# Patient Record
Sex: MALE | URBAN - METROPOLITAN AREA
[De-identification: names, ages, dates, MRNs, and addresses within clinical notes are randomized per-mention and may not be internally consistent; named-entity substitution may affect disease eponyms.]

---

## 2019-11-18 ENCOUNTER — OFFICE VISIT (OUTPATIENT)
Dept: ORTHOPEDICS | Facility: CLINIC | Age: 59
End: 2019-11-18
Payer: MEDICAID

## 2019-11-18 VITALS — TEMPERATURE: 98 F | WEIGHT: 165.19 LBS | BODY MASS INDEX: 26.55 KG/M2 | HEIGHT: 66 IN

## 2019-11-18 DIAGNOSIS — S62.336A CLOSED DISPLACED FRACTURE OF NECK OF FIFTH METACARPAL BONE OF RIGHT HAND, INITIAL ENCOUNTER: Primary | ICD-10-CM

## 2019-11-18 LAB
BANDS: 1 % (ref 0–5)
BUN SERPL-MCNC: 18 MG/DL (ref 7–18)
BUN/CREAT SERPL: 20.45 RATIO (ref 7–18)
CALCIUM SERPL-MCNC: 9.3 MG/DL (ref 8.8–10.5)
CELLS COUNTED: 100
CHLORIDE SERPL-SCNC: 107 MMOL/L (ref 100–108)
CO2 SERPL-SCNC: 29 MMOL/L (ref 21–32)
CREAT SERPL-MCNC: 0.88 MG/DL (ref 0.7–1.3)
EOSINOPHIL NFR BLD: 2 % (ref 1–3)
ERYTHROCYTE [DISTWIDTH] IN BLOOD BY AUTOMATED COUNT: 14.5 % (ref 12.5–18)
GFR ESTIMATION: > 60
GLUCOSE SERPL-MCNC: 89 MG/DL (ref 70–110)
HCT VFR BLD AUTO: 37.7 % (ref 42–52)
HGB BLD-MCNC: 12.7 G/DL (ref 14–18)
LYMPHOCYTES NFR BLD: 47 % (ref 25–40)
MANUAL NRBC PER 100 CELLS: 0 %
MCH RBC QN AUTO: 29.9 PG (ref 27–31.2)
MCHC RBC AUTO-ENTMCNC: 33.7 G/DL (ref 31.8–35.4)
MCV RBC AUTO: 88.7 FL (ref 80–97)
MONOCYTES NFR BLD MANUAL: 14 % (ref 1–15)
NEUTROPHILS ABSOLUTE COUNT: 2.1 10*3/UL (ref 1.8–7.7)
NEUTROPHILS NFR BLD: 36 % (ref 37–80)
PLATELETS: 210 10*3/UL (ref 142–424)
POTASSIUM SERPL-SCNC: 4.8 MMOL/L (ref 3.6–5.2)
RBC # BLD AUTO: 4.25 10*6/UL (ref 4.7–6.1)
RBC MORPH BLD: ABNORMAL
SMALL PLATELETS BLD QL SMEAR: ADEQUATE
SODIUM BLD-SCNC: 144 MMOL/L (ref 135–145)
WBC # BLD: 5.8 10*3/UL (ref 4.6–10.2)

## 2019-11-18 PROCEDURE — 99201 PR OFFICE/OUTPT VISIT,NEW,LEVL I: ICD-10-PCS | Mod: S$GLB,,, | Performed by: ORTHOPAEDIC SURGERY

## 2019-11-18 PROCEDURE — 99201 PR OFFICE/OUTPT VISIT,NEW,LEVL I: CPT | Mod: S$GLB,,, | Performed by: ORTHOPAEDIC SURGERY

## 2019-11-18 RX ORDER — IBUPROFEN 800 MG/1
TABLET ORAL
COMMUNITY
Start: 2019-11-05

## 2019-11-18 RX ORDER — ALLOPURINOL 300 MG/1
TABLET ORAL
COMMUNITY
Start: 2019-11-05

## 2019-11-18 RX ORDER — LISINOPRIL 20 MG/1
TABLET ORAL
COMMUNITY
Start: 2019-11-05

## 2019-11-26 ENCOUNTER — OUTSIDE PLACE OF SERVICE (OUTPATIENT)
Dept: ORTHOPEDICS | Facility: CLINIC | Age: 59
End: 2019-11-26
Payer: MEDICAID

## 2019-11-26 PROCEDURE — 26615 TREAT METACARPAL FRACTURE: CPT | Mod: RT,,, | Performed by: ORTHOPAEDIC SURGERY

## 2019-11-26 PROCEDURE — 26615 PR OPEN TX METACARPAL FRACTURE SINGLE EA BONE: ICD-10-PCS | Mod: RT,,, | Performed by: ORTHOPAEDIC SURGERY

## 2019-12-03 ENCOUNTER — OFFICE VISIT (OUTPATIENT)
Dept: ORTHOPEDICS | Facility: CLINIC | Age: 59
End: 2019-12-03
Payer: MEDICAID

## 2019-12-03 DIAGNOSIS — S62.336A CLOSED DISPLACED FRACTURE OF NECK OF FIFTH METACARPAL BONE OF RIGHT HAND, INITIAL ENCOUNTER: Primary | ICD-10-CM

## 2019-12-03 PROCEDURE — 99024 POSTOP FOLLOW-UP VISIT: CPT | Mod: S$GLB,,, | Performed by: ORTHOPAEDIC SURGERY

## 2019-12-03 PROCEDURE — 99024 PR POST-OP FOLLOW-UP VISIT: ICD-10-PCS | Mod: S$GLB,,, | Performed by: ORTHOPAEDIC SURGERY

## 2019-12-03 RX ORDER — OXYCODONE AND ACETAMINOPHEN 5; 325 MG/1; MG/1
TABLET ORAL
COMMUNITY
Start: 2019-11-26

## 2019-12-03 NOTE — PROGRESS NOTES
Subjective:      Patient ID: Catrachito Ricci is a 59 y.o. male.    Chief Complaint: Post-op Evaluation of the Right Hand    HPI 59-year-old man 1 week postop ORIF right 5th metacarpal shaft fracture    ROS unchanged from prior visit      Objective:      Incision is clean without drainage. He has mild swelling      Ortho/SPM Exam            Assessment:       Encounter Diagnosis   Name Primary?    Closed displaced fracture of neck of fifth metacarpal bone of right hand, initial encounter Yes          Plan:       Catrachito was seen today for post-op evaluation.    Diagnoses and all orders for this visit:    Closed displaced fracture of neck of fifth metacarpal bone of right hand, initial encounter  -     X-Ray Hand 3 view Right; Future     X-ray shows good position and alignment of the hardware and fracture.  Return 1 week for suture removal

## 2019-12-09 ENCOUNTER — OFFICE VISIT (OUTPATIENT)
Dept: ORTHOPEDICS | Facility: CLINIC | Age: 59
End: 2019-12-09
Payer: MEDICAID

## 2019-12-09 DIAGNOSIS — S62.336A CLOSED DISPLACED FRACTURE OF NECK OF FIFTH METACARPAL BONE OF RIGHT HAND, INITIAL ENCOUNTER: Primary | ICD-10-CM

## 2019-12-09 PROCEDURE — 99024 PR POST-OP FOLLOW-UP VISIT: ICD-10-PCS | Mod: S$GLB,,, | Performed by: ORTHOPAEDIC SURGERY

## 2019-12-09 PROCEDURE — 99024 POSTOP FOLLOW-UP VISIT: CPT | Mod: S$GLB,,, | Performed by: ORTHOPAEDIC SURGERY

## 2019-12-09 NOTE — PROGRESS NOTES
Subjective:      Patient ID: Catrachito Ricci is a 59 y.o. male.    Chief Complaint: Post-op Evaluation of the Right Hand    HPI patient is 2 weeks postop ORIF of his right 5th metacarpal fracture. He is doing well    ROS    unchanged from prior visit  Objective:      Incision is clean.  There is minimal swelling. There is no drainage.      Ortho/SPM Exam            Assessment:       Encounter Diagnosis   Name Primary?    Closed displaced fracture of neck of fifth metacarpal bone of right hand, initial encounter Yes          Plan:       Catrachito was seen today for post-op evaluation.    Diagnoses and all orders for this visit:    Closed displaced fracture of neck of fifth metacarpal bone of right hand, initial encounter     His 5th finger is mary ann-taped to the 4th finger.  He is encouraged with gentle range of motion. He is to avoid heavy lifting

## 2019-12-10 DIAGNOSIS — S62.336A CLOSED DISPLACED FRACTURE OF NECK OF FIFTH METACARPAL BONE OF RIGHT HAND, INITIAL ENCOUNTER: Primary | ICD-10-CM

## 2019-12-10 RX ORDER — ACETAMINOPHEN AND CODEINE PHOSPHATE 300; 30 MG/1; MG/1
1 TABLET ORAL 2 TIMES DAILY PRN
Qty: 14 TABLET | Refills: 0 | Status: SHIPPED | OUTPATIENT
Start: 2019-12-10 | End: 2019-12-20

## 2019-12-23 ENCOUNTER — OFFICE VISIT (OUTPATIENT)
Dept: ORTHOPEDICS | Facility: CLINIC | Age: 59
End: 2019-12-23
Payer: MEDICAID

## 2019-12-23 DIAGNOSIS — S62.336A CLOSED DISPLACED FRACTURE OF NECK OF FIFTH METACARPAL BONE OF RIGHT HAND, INITIAL ENCOUNTER: Primary | ICD-10-CM

## 2019-12-23 PROCEDURE — 99024 PR POST-OP FOLLOW-UP VISIT: ICD-10-PCS | Mod: S$GLB,,, | Performed by: ORTHOPAEDIC SURGERY

## 2019-12-23 PROCEDURE — 99024 POSTOP FOLLOW-UP VISIT: CPT | Mod: S$GLB,,, | Performed by: ORTHOPAEDIC SURGERY

## 2019-12-23 NOTE — PROGRESS NOTES
Subjective:      Patient ID: Catrachito Ricci is a 59 y.o. male.    Chief Complaint: Post-op Evaluation of the Right Hand    HPI patient is 1 month postop ORIF of his right 5th metacarpal fracture.    ROS unchanged from prior visit      Objective:      He has range of motion from -5 to 90 degrees at the MCP joint. The incision is well healed.  He still has mild swelling      Ortho/SPM Exam            Assessment:       Encounter Diagnosis   Name Primary?    Closed displaced fracture of neck of fifth metacarpal bone of right hand, initial encounter Yes          Plan:       Catrachito was seen today for post-op evaluation.    Diagnoses and all orders for this visit:    Closed displaced fracture of neck of fifth metacarpal bone of right hand, initial encounter  -     X-Ray Hand 3 view Right; Future     X-ray shows the fracture continues to consolidate.  He is encouraged with range of motion.  Return p.r.n.